# Patient Record
Sex: MALE | Race: WHITE | NOT HISPANIC OR LATINO
[De-identification: names, ages, dates, MRNs, and addresses within clinical notes are randomized per-mention and may not be internally consistent; named-entity substitution may affect disease eponyms.]

---
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
## 2021-04-30 ENCOUNTER — EMERGENCY (EMERGENCY)
Facility: HOSPITAL | Age: 21
LOS: 1 days | Discharge: HOME | End: 2021-04-30
Attending: EMERGENCY MEDICINE | Admitting: EMERGENCY MEDICINE
Payer: COMMERCIAL

## 2021-04-30 VITALS
RESPIRATION RATE: 18 BRPM | DIASTOLIC BLOOD PRESSURE: 99 MMHG | HEART RATE: 88 BPM | SYSTOLIC BLOOD PRESSURE: 156 MMHG | OXYGEN SATURATION: 100 % | TEMPERATURE: 98 F

## 2021-04-30 PROCEDURE — 99283 EMERGENCY DEPT VISIT LOW MDM: CPT

## 2021-04-30 PROCEDURE — 99053 MED SERV 10PM-8AM 24 HR FAC: CPT

## 2021-04-30 NOTE — ED ADULT TRIAGE NOTE - CHIEF COMPLAINT QUOTE
c/o swelling to foreskin. reports foreskin has been retracted for a few hours and has been difficult to pee. denies pain at present, only has pain with erections.

## 2021-05-01 VITALS
DIASTOLIC BLOOD PRESSURE: 78 MMHG | RESPIRATION RATE: 18 BRPM | OXYGEN SATURATION: 100 % | HEART RATE: 75 BPM | SYSTOLIC BLOOD PRESSURE: 129 MMHG

## 2021-05-01 DIAGNOSIS — N47.2 PARAPHIMOSIS: ICD-10-CM

## 2021-05-01 DIAGNOSIS — R33.9 RETENTION OF URINE, UNSPECIFIED: ICD-10-CM

## 2021-05-01 LAB
APPEARANCE UR: CLEAR — SIGNIFICANT CHANGE UP
BILIRUB UR-MCNC: NEGATIVE — SIGNIFICANT CHANGE UP
COLOR SPEC: SIGNIFICANT CHANGE UP
DIFF PNL FLD: NEGATIVE — SIGNIFICANT CHANGE UP
GLUCOSE UR QL: NEGATIVE — SIGNIFICANT CHANGE UP
KETONES UR-MCNC: NEGATIVE — SIGNIFICANT CHANGE UP
LEUKOCYTE ESTERASE UR-ACNC: NEGATIVE — SIGNIFICANT CHANGE UP
NITRITE UR-MCNC: NEGATIVE — SIGNIFICANT CHANGE UP
PH UR: 7 — SIGNIFICANT CHANGE UP (ref 5–8)
PROT UR-MCNC: NEGATIVE — SIGNIFICANT CHANGE UP
SP GR SPEC: 1.01 — SIGNIFICANT CHANGE UP (ref 1.01–1.02)
UROBILINOGEN FLD QL: SIGNIFICANT CHANGE UP

## 2021-05-01 RX ORDER — LIDOCAINE HCL 20 MG/ML
20 VIAL (ML) INJECTION ONCE
Refills: 0 | Status: COMPLETED | OUTPATIENT
Start: 2021-05-01 | End: 2021-05-01

## 2021-05-01 RX ADMIN — Medication 20 MILLILITER(S): at 02:09

## 2021-05-01 NOTE — CONSULT NOTE ADULT - PROBLEM SELECTOR RECOMMENDATION 2
-Patient to be discharged with esquivel catheter.  -Follow-up early next week for attempt at TOV.  -Patient to follow-up with the [facility], [phone]  -Discussed with Dr. Thomas

## 2021-05-01 NOTE — CONSULT NOTE ADULT - PROBLEM SELECTOR RECOMMENDATION 9
-Foreskin care education provided.  -Patient to not attempt to retract foreskin for the next week.  -Return to ED if paraphimosis recurs  -Follow-up at the [facility]

## 2021-05-01 NOTE — CONSULT NOTE ADULT - ASSESSMENT
21 year old male with a medical history of Depression s/p recent hospitalization, presenting to the ED with a paraphimosis and in urinary retention, s/p manual reduction of paraphimosis, and need of esquivel placement for retention.

## 2021-05-01 NOTE — ED PROVIDER NOTE - CLINICAL SUMMARY MEDICAL DECISION MAKING FREE TEXT BOX
O'Ervin DO PGY-1: pt p/w paraphimosis. Will reduce. Will do local block w/ 1% lido. TBDC O'Ervin DO PGY-1: pt p/w paraphimosis. Will reduce. Will do local block w/ 1% lido and monitor

## 2021-05-01 NOTE — ED PROVIDER NOTE - NSFOLLOWUPINSTRUCTIONS_ED_ALL_ED_FT
(1) Follow up with your primary care physician and urologist as discussed. In addition, we did not find evidence of a life threatening illness on your testing here today, but listed below are the specialists that will be necessary to see as an outpatient to continue the workup.  Please call the numbers listed below or [phone] to set up the necessary appointments.  (2) Immediately seek care at your nearest emergency room if your symptoms worsen, persist, or do not resolve   (3) Take Tylenol and/or Motrin as needed for pain per the dosing instructions on the bottle.     Acute Paraphimosis    WHAT YOU NEED TO KNOW:    Acute paraphimosis is abnormal tightness of the foreskin when it is pulled back. The foreskin is the skin that covers the head (glans) of the penis. Usually, the foreskin can be pulled back onto the penis and uncover the glans. Acute paraphimosis prevents your foreskin from being pulled back.     DISCHARGE INSTRUCTIONS:    Return to the emergency department if:   •You have sudden pain or swelling in your penis.  •You lose feeling in your penis.  •You have an open wound on your penis.    Contact your healthcare provider if:   •Your signs and symptoms return or worsen.  •You have pain during sexual activities.  •You have questions or concerns about your condition or care.    Self care:   •Do not have sex until your healthcare provider says it is okay. Do not have any sexual activity for 7 to 10 days, to allow the penis to heal. Sexual activity includes intercourse and masturbation. Ask when you can go back to your usual sexual activities.    •Keep your penis clean. Clean your penis every day by removing the smegma around your glans. Ask for more information about foreskin care.    •Gently move your foreskin back to the normal position. Every time your foreskin is pulled back, make sure it returns to its original position. The foreskin must always cover the glans. Do not force the foreskin back over the glans. Force can cause scars to form on the penis. [facility]  [address] [place] [address] [address], [place] [address]  [phone]    CALL AND MAKE APPOINTMENT AT THE ABOVE LOCATION FOR MONDAY OR TUESDAY.     (1) Follow up with your primary care physician and urologist as discussed. In addition, we did not find evidence of a life threatening illness on your testing here today, but listed below are the specialists that will be necessary to see as an outpatient to continue the workup.  Please call the numbers listed below or [phone] to set up the necessary appointments.  (2) Immediately seek care at your nearest emergency room if your symptoms worsen, persist, or do not resolve   (3) Take Tylenol and/or Motrin as needed for pain per the dosing instructions on the bottle.     Acute Paraphimosis    WHAT YOU NEED TO KNOW:    Acute paraphimosis is abnormal tightness of the foreskin when it is pulled back. The foreskin is the skin that covers the head (glans) of the penis. Usually, the foreskin can be pulled back onto the penis and uncover the glans. Acute paraphimosis prevents your foreskin from being pulled back.     DISCHARGE INSTRUCTIONS:    Return to the emergency department if:   •You have sudden pain or swelling in your penis.  •You lose feeling in your penis.  •You have an open wound on your penis.    Contact your healthcare provider if:   •Your signs and symptoms return or worsen.  •You have pain during sexual activities.  •You have questions or concerns about your condition or care.    Self care:   •Do not have sex until your healthcare provider says it is okay. Do not have any sexual activity for 7 to 10 days, to allow the penis to heal. Sexual activity includes intercourse and masturbation. Ask when you can go back to your usual sexual activities.    •Keep your penis clean. Clean your penis every day by removing the smegma around your glans. Ask for more information about foreskin care.    •Gently move your foreskin back to the normal position. Every time your foreskin is pulled back, make sure it returns to its original position. The foreskin must always cover the glans. Do not force the foreskin back over the glans. Force can cause scars to form on the penis.

## 2021-05-01 NOTE — ED PROVIDER NOTE - NSFOLLOWUPCLINICS_GEN_ALL_ED_FT
[facility]  Urology  [address], [address]rd & [address]th [place] [address]  Phone: [phone]  Fax:   Follow Up Time: 1-3 Days

## 2021-05-01 NOTE — ED PROVIDER NOTE - ATTENDING CONTRIBUTION TO CARE
HPI: 22 y/o uncircumsized M c/o a "retracted foreskin" that is not able to be reduced since 6-7pm. States he pulled it up to try to clean it then it would never go down. Pt is on trazodone but has not taken it in 3-4 days. Pt endorses pain on urination. Pt is not sexually active. In the ED pain is 3/10. no pain meds taken prior to arrival. denies any trauma except chronic masterbation. No penile discharge or bleeding.   EXAM: NAD, penis with paraphimosis, no discharge, no bleeding, no scrotal edema.   MDM: pt with uncircumsized penis that has paraphyimosis. Will reduce with dorsal nerve block and then manually reduce, if not able then may consult URO.

## 2021-05-01 NOTE — ED ADULT NURSE REASSESSMENT NOTE - NS ED NURSE REASSESS COMMENT FT1
Pt reporting lower abdominal pain, unable to urinate. MD US at bedside showing 700 cc urine in bladder. Pt placed in room 15, 14 fr esquivel cath placed using sterile technique. Pt tolerated well. Clear yellow urine draining to collection bag. PT immediately felt relief.
1100 cc urine into collection bag. Pt esquivel connected to leg bag. Teaching/ education done with patient.

## 2021-05-01 NOTE — ED PROVIDER NOTE - PHYSICAL EXAMINATION
CONSTITUTIONAL: Well-developed; well-nourished; in no acute distress.   SKIN: warm, dry  HEAD: Normocephalic; atraumatic.  EYES: no conjunctival injection.   ENT: No nasal discharge; airway clear.  NECK: Supple; non tender.  CARD: S1, S2 normal; no murmurs, gallops, or rubs. Regular rate and rhythm.   RESP: No wheezes, rales or rhonchi. Good air movement bilaterally.   ABD: soft ntnd, no guarding, no distention, no rigidity.   EXT: Ambulates independently.    : Performed w/ Dr. Benson. Paraphimosis.   NEURO: Alert, oriented, grossly unremarkable  PSYCH: Cooperative, appropriate.

## 2021-05-01 NOTE — ED ADULT NURSE NOTE - PAIN RATING/NUMBER SCALE (0-10): ACTIVITY
Will addend the note. Letter in epic that can be used also.   Thank you.  Dusty Otero, MPAS, PA-C    3

## 2021-05-01 NOTE — ED ADULT NURSE NOTE - OBJECTIVE STATEMENT
Pt is a 21 year old male reporting to the ED for "retracted foreskin". Pt reports this evening he was cleaning his foreskin and pulled skin back, pt reports foreskin did not go back after he retracted it.  Pt reports pain on urination. Pain at this time 3/10. Pt is AOX4. Pt denies chest pain or SOB. PT respirations even an unlabored. Pt appears to be comfortable, in NAD. Pt denies fever, chills, n/v/d. Pt denies abdominal pain, hematuria. awaiting further orders, will continue to monitor.

## 2021-05-01 NOTE — ED PROVIDER NOTE - PROGRESS NOTE DETAILS
Pt has successful paraphimosis reduction. Waiting to check it pt can urinate. If can, will dc to f/u with URO outpt. O'Ervin DO PGY-1: performed POCUS of bladder which showed roughly 720 ml. Will straight cath the patient. O'Ervin DO PGY-1: paged uro for consult O'Ervin DO PGY-1: will d/c pt w/ esquivel leg bag, uro want him to follow up in clinic early next week

## 2021-05-01 NOTE — ED PROVIDER NOTE - NS ED ROS FT
CONSTITUTIONAL - No fever, No diaphoresis, No weight change  EYES - No eye pain, No blurred vision  ENT - No change in hearing, No sore throat, No neck pain, No rhinorrhea, No ear pain  RESPIRATORY - No shortness of breath, No cough  CARDIAC -No chest pain, No palpitations  GI - No abdominal pain, No nausea, No vomiting, No diarrhea, No constipation  - pain on the tip of the penis.   MUSCULOSKELETAL - No joint pain, No swelling, No back pain  NEUROLOGIC - No numbness, No focal weakness, No headache, No dizziness

## 2021-05-01 NOTE — ED PROVIDER NOTE - PATIENT PORTAL LINK FT
You can access the FollowMyHealth Patient Portal offered by [facility] by registering at the following website: [email or web address]. By joining [employer]’s FollowMyHealth portal, you will also be able to view your health information using other applications (apps) compatible with our system.

## 2021-05-01 NOTE — ED PROCEDURE NOTE - ATTENDING CONTRIBUTION TO CARE
DR. LOPEZ, ATTENDING MD-  I was in the exam room and observed and supervised the resident when they were completing the key portions of this procedure.

## 2021-05-01 NOTE — ED PROCEDURE NOTE - CPROC ED POST PROC CARE GUIDE1
Verbal/written post procedure instructions were given to patient/caregiver./Instructed patient/caregiver to follow-up with primary care physician./Instructed patient/caregiver regarding signs and symptoms of infection.
Verbal/written post procedure instructions were given to patient/caregiver./Instructed patient/caregiver to follow-up with primary care physician./Instructed patient/caregiver regarding signs and symptoms of infection.

## 2021-05-01 NOTE — ED PROVIDER NOTE - OBJECTIVE STATEMENT
22 y/o M c/o a "retracted foreskin" since 6-7pm. States he pulled it up to try to clean it then it would never go down. Pt is on trazodone but has not taken it in 3-4 days. Pt endorses pain on urination. Pt is not sexually active. In the ED pain is 3/10.

## 2021-05-02 LAB
CULTURE RESULTS: NO GROWTH — SIGNIFICANT CHANGE UP
SPECIMEN SOURCE: SIGNIFICANT CHANGE UP

## 2021-05-03 ENCOUNTER — TRANSCRIPTION ENCOUNTER (OUTPATIENT)
Age: 21
End: 2021-05-03

## 2021-05-03 ENCOUNTER — APPOINTMENT (OUTPATIENT)
Dept: UROLOGY | Facility: CLINIC | Age: 21
End: 2021-05-03
Payer: COMMERCIAL

## 2021-05-03 VITALS
RESPIRATION RATE: 16 BRPM | HEIGHT: 67 IN | OXYGEN SATURATION: 97 % | TEMPERATURE: 98.1 F | DIASTOLIC BLOOD PRESSURE: 88 MMHG | HEART RATE: 110 BPM | BODY MASS INDEX: 28.25 KG/M2 | WEIGHT: 180 LBS | SYSTOLIC BLOOD PRESSURE: 145 MMHG

## 2021-05-03 DIAGNOSIS — Z78.9 OTHER SPECIFIED HEALTH STATUS: ICD-10-CM

## 2021-05-03 DIAGNOSIS — Z86.59 PERSONAL HISTORY OF OTHER MENTAL AND BEHAVIORAL DISORDERS: ICD-10-CM

## 2021-05-03 PROBLEM — Z00.00 ENCOUNTER FOR PREVENTIVE HEALTH EXAMINATION: Status: ACTIVE | Noted: 2021-05-03

## 2021-05-03 PROCEDURE — 99204 OFFICE O/P NEW MOD 45 MIN: CPT

## 2021-05-03 PROCEDURE — 99072 ADDL SUPL MATRL&STAF TM PHE: CPT

## 2021-05-03 RX ORDER — ESCITALOPRAM OXALATE 5 MG/1
TABLET, FILM COATED ORAL
Refills: 0 | Status: ACTIVE | COMMUNITY

## 2021-05-03 RX ORDER — TRIAMCINOLONE ACETONIDE 1 MG/G
0.1 CREAM TOPICAL TWICE DAILY
Qty: 1 | Refills: 0 | Status: ACTIVE | COMMUNITY
Start: 2021-05-03 | End: 1900-01-01

## 2021-05-03 RX ORDER — ARIPIPRAZOLE 2 MG/1
TABLET ORAL
Refills: 0 | Status: ACTIVE | COMMUNITY

## 2021-05-03 NOTE — PHYSICAL EXAM
[General Appearance - Well Developed] : well developed [General Appearance - Well Nourished] : well nourished [Normal Appearance] : normal appearance [Well Groomed] : well groomed [General Appearance - In No Acute Distress] : no acute distress [Edema] : no peripheral edema [Respiration, Rhythm And Depth] : normal respiratory rhythm and effort [Exaggerated Use Of Accessory Muscles For Inspiration] : no accessory muscle use [Abdomen Soft] : soft [Abdomen Tenderness] : non-tender [Costovertebral Angle Tenderness] : no ~M costovertebral angle tenderness [Urethral Meatus] : meatus normal [Penis Abnormality] : normal uncircumcised penis [Urinary Bladder Findings] : the bladder was normal on palpation [Scrotum] : the scrotum was normal [Testes Tenderness] : no tenderness of the testes [Testes Mass (___cm)] : there were no testicular masses [Normal Station and Gait] : the gait and station were normal for the patient's age [] : no rash [No Focal Deficits] : no focal deficits [Oriented To Time, Place, And Person] : oriented to person, place, and time [Affect] : the affect was normal [Mood] : the mood was normal [Not Anxious] : not anxious [FreeTextEntry1] : able to retract, no significant phimosis, however there is irritation/erythematous band noted of foreskin - likely corresponding to prior paraphimosis

## 2021-05-03 NOTE — ASSESSMENT
[FreeTextEntry1] : Patient is a 22 yo M who presents for urinary retention and paraphimosis.\par \par Longo removed today\par Passed TOV, PVR slightly elevated, however pt drank >1L in short duration\par For inflammed foreskin, topical steroid for 2 wks\par D/w pt follow up in 2 wks, will check PVR and foreskin\par F/u 2 wk\par \par I have spent 45 minutes of time on the encounter including reviewing prior records, discussing with pt the above condition and various treatment options, and documentation.

## 2021-05-03 NOTE — HISTORY OF PRESENT ILLNESS
[FreeTextEntry1] : Patient is a 22 yo M who presents for paraphimosis and urinary retention.\par He recently went to [facility] ER for paraphimosis.  He had been practicing retracting his foreskin while having an erection, as he has been discussing this with his friends that he should expose his head of penis.  He is not sexually active currently.\par After retracting he was unable to pull back over and developed paraphimosis.  He had some urge to void during paraphimosis, but unable to empty.  Ultimately in ER paraphimosis was reduced, however he had retention and esquivel was placed. \par Currently he has penile pain with catheter.  No fever/chills.\par Urine testing no infection from ER.\par \par He has depression/anxiety, recently started on lexapro.\par \par

## 2021-05-20 ENCOUNTER — APPOINTMENT (OUTPATIENT)
Dept: UROLOGY | Facility: CLINIC | Age: 21
End: 2021-05-20
Payer: COMMERCIAL

## 2021-05-20 VITALS
OXYGEN SATURATION: 97 % | TEMPERATURE: 97.5 F | SYSTOLIC BLOOD PRESSURE: 140 MMHG | RESPIRATION RATE: 16 BRPM | DIASTOLIC BLOOD PRESSURE: 88 MMHG | WEIGHT: 184 LBS | HEART RATE: 97 BPM

## 2021-05-20 DIAGNOSIS — N47.2 PARAPHIMOSIS: ICD-10-CM

## 2021-05-20 DIAGNOSIS — N47.8 OTHER DISORDERS OF PREPUCE: ICD-10-CM

## 2021-05-20 DIAGNOSIS — R33.8 OTHER RETENTION OF URINE: ICD-10-CM

## 2021-05-20 PROCEDURE — 99213 OFFICE O/P EST LOW 20 MIN: CPT

## 2021-05-20 PROCEDURE — 99072 ADDL SUPL MATRL&STAF TM PHE: CPT

## 2021-05-20 NOTE — ASSESSMENT
[FreeTextEntry1] : Patient is a 20 yo M who presents for urinary retention and paraphimosis.\par \par Voiding well\par PVR today 14cc\par Counseled on penile/foreskin hygiene\par D/w pt that he does not need to retract his foreskin during erections/sexual activity\par F/u PRN

## 2021-05-20 NOTE — PHYSICAL EXAM
[General Appearance - Well Developed] : well developed [General Appearance - Well Nourished] : well nourished [Normal Appearance] : normal appearance [Well Groomed] : well groomed [General Appearance - In No Acute Distress] : no acute distress [Urethral Meatus] : meatus normal [Penis Abnormality] : normal uncircumcised penis [Urinary Bladder Findings] : the bladder was normal on palpation [Scrotum] : the scrotum was normal [FreeTextEntry1] : easily retractable foreskin, no paraphimosis or phimosis.

## 2021-05-20 NOTE — HISTORY OF PRESENT ILLNESS
[FreeTextEntry1] : Patient is a 22 yo M who presents for f/u of paraphimosis and urinary retention.\par \par Here for f/u.  Voiding well.  Back at baseline, no voiding complaints.  Also has been able to retract his foreskin without difficulty, except for during erections hwere he will have a bit of pain.  He has been trying to retract during erection to expose his glans.\par \par PRIOR Hx:\par He recently went to [facility] ER for paraphimosis.  He had been practicing retracting his foreskin while having an erection, as he has been discussing this with his friends that he should expose his head of penis.  He is not sexually active currently.\par After retracting he was unable to pull back over and developed paraphimosis.  He had some urge to void during paraphimosis, but unable to empty.  Ultimately in ER paraphimosis was reduced, however he had retention and esquivel was placed. \par Currently he has penile pain with catheter.  No fever/chills.\par Urine testing no infection from ER.\par \par He has depression/anxiety, recently started on lexapro.\par \par

## 2021-06-02 ENCOUNTER — EMERGENCY (EMERGENCY)
Facility: HOSPITAL | Age: 21
LOS: 1 days | Discharge: HOME | End: 2021-06-02
Admitting: EMERGENCY MEDICINE
Payer: COMMERCIAL

## 2021-06-02 VITALS
SYSTOLIC BLOOD PRESSURE: 138 MMHG | TEMPERATURE: 98 F | DIASTOLIC BLOOD PRESSURE: 70 MMHG | OXYGEN SATURATION: 100 % | RESPIRATION RATE: 18 BRPM | HEART RATE: 93 BPM

## 2021-06-02 DIAGNOSIS — F43.20 ADJUSTMENT DISORDER, UNSPECIFIED: ICD-10-CM

## 2021-06-02 LAB
ALBUMIN SERPL ELPH-MCNC: 4.7 G/DL — SIGNIFICANT CHANGE UP (ref 3.3–5)
ALP SERPL-CCNC: 90 U/L — SIGNIFICANT CHANGE UP (ref 40–120)
ALT FLD-CCNC: 17 U/L — SIGNIFICANT CHANGE UP (ref 4–41)
AMPHET UR-MCNC: NEGATIVE — SIGNIFICANT CHANGE UP
ANION GAP SERPL CALC-SCNC: 12 MMOL/L — SIGNIFICANT CHANGE UP (ref 7–14)
APAP SERPL-MCNC: <15 UG/ML — SIGNIFICANT CHANGE UP (ref 15–25)
APPEARANCE UR: CLEAR — SIGNIFICANT CHANGE UP
AST SERPL-CCNC: 18 U/L — SIGNIFICANT CHANGE UP (ref 4–40)
BARBITURATES UR SCN-MCNC: NEGATIVE — SIGNIFICANT CHANGE UP
BASOPHILS # BLD AUTO: 0.06 K/UL — SIGNIFICANT CHANGE UP (ref 0–0.2)
BASOPHILS NFR BLD AUTO: 0.9 % — SIGNIFICANT CHANGE UP (ref 0–2)
BENZODIAZ UR-MCNC: NEGATIVE — SIGNIFICANT CHANGE UP
BILIRUB SERPL-MCNC: 0.3 MG/DL — SIGNIFICANT CHANGE UP (ref 0.2–1.2)
BILIRUB UR-MCNC: NEGATIVE — SIGNIFICANT CHANGE UP
BUN SERPL-MCNC: 14 MG/DL — SIGNIFICANT CHANGE UP (ref 7–23)
CALCIUM SERPL-MCNC: 9.5 MG/DL — SIGNIFICANT CHANGE UP (ref 8.4–10.5)
CHLORIDE SERPL-SCNC: 103 MMOL/L — SIGNIFICANT CHANGE UP (ref 98–107)
CO2 SERPL-SCNC: 24 MMOL/L — SIGNIFICANT CHANGE UP (ref 22–31)
COCAINE METAB.OTHER UR-MCNC: NEGATIVE — SIGNIFICANT CHANGE UP
COLOR SPEC: YELLOW — SIGNIFICANT CHANGE UP
COVID-19 SPIKE DOMAIN AB INTERP: POSITIVE
COVID-19 SPIKE DOMAIN ANTIBODY RESULT: 136 U/ML — HIGH
CREAT SERPL-MCNC: 0.75 MG/DL — SIGNIFICANT CHANGE UP (ref 0.5–1.3)
CREATININE URINE RESULT, DAU: 146 MG/DL — SIGNIFICANT CHANGE UP
DIFF PNL FLD: NEGATIVE — SIGNIFICANT CHANGE UP
EOSINOPHIL # BLD AUTO: 0.07 K/UL — SIGNIFICANT CHANGE UP (ref 0–0.5)
EOSINOPHIL NFR BLD AUTO: 1 % — SIGNIFICANT CHANGE UP (ref 0–6)
ETHANOL SERPL-MCNC: <10 MG/DL — SIGNIFICANT CHANGE UP
GLUCOSE SERPL-MCNC: 103 MG/DL — HIGH (ref 70–99)
GLUCOSE UR QL: NEGATIVE — SIGNIFICANT CHANGE UP
HCT VFR BLD CALC: 46.6 % — SIGNIFICANT CHANGE UP (ref 39–50)
HGB BLD-MCNC: 15.9 G/DL — SIGNIFICANT CHANGE UP (ref 13–17)
IANC: 5.16 K/UL — SIGNIFICANT CHANGE UP (ref 1.5–8.5)
IMM GRANULOCYTES NFR BLD AUTO: 0.3 % — SIGNIFICANT CHANGE UP (ref 0–1.5)
KETONES UR-MCNC: NEGATIVE — SIGNIFICANT CHANGE UP
LEUKOCYTE ESTERASE UR-ACNC: NEGATIVE — SIGNIFICANT CHANGE UP
LYMPHOCYTES # BLD AUTO: 1.42 K/UL — SIGNIFICANT CHANGE UP (ref 1–3.3)
LYMPHOCYTES # BLD AUTO: 20.2 % — SIGNIFICANT CHANGE UP (ref 13–44)
MCHC RBC-ENTMCNC: 30.8 PG — SIGNIFICANT CHANGE UP (ref 27–34)
MCHC RBC-ENTMCNC: 34.1 GM/DL — SIGNIFICANT CHANGE UP (ref 32–36)
MCV RBC AUTO: 90.3 FL — SIGNIFICANT CHANGE UP (ref 80–100)
METHADONE UR-MCNC: NEGATIVE — SIGNIFICANT CHANGE UP
MONOCYTES # BLD AUTO: 0.29 K/UL — SIGNIFICANT CHANGE UP (ref 0–0.9)
MONOCYTES NFR BLD AUTO: 4.1 % — SIGNIFICANT CHANGE UP (ref 2–14)
NEUTROPHILS # BLD AUTO: 5.16 K/UL — SIGNIFICANT CHANGE UP (ref 1.8–7.4)
NEUTROPHILS NFR BLD AUTO: 73.5 % — SIGNIFICANT CHANGE UP (ref 43–77)
NITRITE UR-MCNC: NEGATIVE — SIGNIFICANT CHANGE UP
NRBC # BLD: 0 /100 WBCS — SIGNIFICANT CHANGE UP
NRBC # FLD: 0 K/UL — SIGNIFICANT CHANGE UP
OPIATES UR-MCNC: NEGATIVE — SIGNIFICANT CHANGE UP
OXYCODONE UR-MCNC: NEGATIVE — SIGNIFICANT CHANGE UP
PCP SPEC-MCNC: SIGNIFICANT CHANGE UP
PCP UR-MCNC: NEGATIVE — SIGNIFICANT CHANGE UP
PH UR: 7 — SIGNIFICANT CHANGE UP (ref 5–8)
PLATELET # BLD AUTO: 360 K/UL — SIGNIFICANT CHANGE UP (ref 150–400)
POTASSIUM SERPL-MCNC: 3.8 MMOL/L — SIGNIFICANT CHANGE UP (ref 3.5–5.3)
POTASSIUM SERPL-SCNC: 3.8 MMOL/L — SIGNIFICANT CHANGE UP (ref 3.5–5.3)
PROT SERPL-MCNC: 8 G/DL — SIGNIFICANT CHANGE UP (ref 6–8.3)
PROT UR-MCNC: ABNORMAL
RBC # BLD: 5.16 M/UL — SIGNIFICANT CHANGE UP (ref 4.2–5.8)
RBC # FLD: 12 % — SIGNIFICANT CHANGE UP (ref 10.3–14.5)
SALICYLATES SERPL-MCNC: <5 MG/DL — LOW (ref 15–30)
SARS-COV-2 IGG+IGM SERPL QL IA: 136 U/ML — HIGH
SARS-COV-2 IGG+IGM SERPL QL IA: POSITIVE
SARS-COV-2 RNA SPEC QL NAA+PROBE: SIGNIFICANT CHANGE UP
SODIUM SERPL-SCNC: 139 MMOL/L — SIGNIFICANT CHANGE UP (ref 135–145)
SP GR SPEC: 1.02 — SIGNIFICANT CHANGE UP (ref 1.01–1.02)
THC UR QL: NEGATIVE — SIGNIFICANT CHANGE UP
TSH SERPL-MCNC: 1.47 UIU/ML — SIGNIFICANT CHANGE UP (ref 0.27–4.2)
UROBILINOGEN FLD QL: SIGNIFICANT CHANGE UP
WBC # BLD: 7.02 K/UL — SIGNIFICANT CHANGE UP (ref 3.8–10.5)
WBC # FLD AUTO: 7.02 K/UL — SIGNIFICANT CHANGE UP (ref 3.8–10.5)

## 2021-06-02 PROCEDURE — 90792 PSYCH DIAG EVAL W/MED SRVCS: CPT

## 2021-06-02 PROCEDURE — 99284 EMERGENCY DEPT VISIT MOD MDM: CPT

## 2021-06-02 NOTE — ED BEHAVIORAL HEALTH ASSESSMENT NOTE - DETAILS
call 911 or return to ED if symptoms worsen or if pt develops urge to harm self or others informed mother see HPI

## 2021-06-02 NOTE — ED PROVIDER NOTE - CLINICAL SUMMARY MEDICAL DECISION MAKING FREE TEXT BOX
This is a 21 yr old M, [facility] depression with c/o AH/HI. Pt arrived from home, lives with mother Ronda ( [phone]). Pt states he is suffering from chronic suicidal ideation for  a year. End of April he was taking edibles and wanted to kill himself and jump out of the window. He went to the hospital and was prescribed medications. Since then he did not see a psychiatrist only today had his first appointment via zoom with Dr Danielle Tamez. He states for the last 2 weeks he is hearing commanding voices and telling him to kill his mother. They do not have  good relationship for the last 4 years but slowly working things out.  About 1 week ago he picked up the knife to see how is it feel. He states his arms became numb and he did not act on trying to stab his mother.  He reports his mother did not know about it till today. 21-Oct-2018 23:22 This is a 21 yr old M, [facility] depression with c/o AH/HI. Pt arrived from home, lives with mother Ronda ( [phone]). Pt states he is suffering from chronic suicidal ideation for  a year. End of April he was taking edibles and wanted to kill himself and jump out of the window. He went to the hospital and was prescribed medications. Since then he did not see a psychiatrist only today had his first appointment via zoom with Dr Danielle Tamez. He states for the last 2 weeks he is hearing commanding voices and telling him to kill his mother. They do not have  good relationship for the last 4 years but slowly working things out.  About 1 week ago he picked up the knife to see how is it feel. He states his arms became numb and he did not act on trying to stab his mother.  He reports his mother did not know about it till today.  Labs- unremrkable  psych - out patient follow up

## 2021-06-02 NOTE — ED PROVIDER NOTE - OBJECTIVE STATEMENT
This is a 21 yr old M, [facility] depression with c/o AH/HI. Pt arrived from home, lives with mother Ronda ( [phone]). Pt states he is suffering from chronic suicidal ideation for  a year. End of April he was taking edibles and wanted to kill himself and jump out of the window. He went to the hospital and was prescribed medications. Since then he did not see a psychiatrist only today had his first appointment via zoom with Dr Danielle Tamez. He states for the last 2 weeks he is hearing commanding voices and telling him to kill his mother. They do not have  good relationship for the last 4 years but slowly working things out.  About 1 week ago he picked up the knife to see how is it feel. He states his arms became numb and he did not act on trying to stab his mother.  He reports his mother did not know about it till today.

## 2021-06-02 NOTE — ED PROVIDER NOTE - PATIENT PORTAL LINK FT
You can access the FollowMyHealth Patient Portal offered by [facility] by registering at the following website: [email or web address]. By joining [employer]’s FollowMyHealth portal, you will also be able to view your health information using other applications (apps) compatible with our system.

## 2021-06-02 NOTE — ED BEHAVIORAL HEALTH ASSESSMENT NOTE - REFERRAL / APPOINTMENT DETAILS
Per Danielle Tamez, pt being screened out of treatment with her; pt advised to follow-up at [facility]

## 2021-06-02 NOTE — ED ADULT TRIAGE NOTE - CHIEF COMPLAINT QUOTE
Pt with depression on multiple medications went to see his psychiatrist for SI and hearing voices stating " kill your mother " x 2 weeks.

## 2021-06-02 NOTE — ED BEHAVIORAL HEALTH ASSESSMENT NOTE - HPI (INCLUDE ILLNESS QUALITY, SEVERITY, DURATION, TIMING, CONTEXT, MODIFYING FACTORS, ASSOCIATED SIGNS AND SYMPTOMS)
Ronda: Hospitalized in [place] about 1 month ago, discharged early May 2021. Doing pretty well since discharge. Med compliant. More communicative with mother. Saw psychiatrist for first time today and said he has thoughts of killing himself and mother. No bizarre behavior, sleeping well. No SI/HI or physical aggression. Eating ok. Looking for a job. Helping out around the house. No substance use. Edibles in the past.   No acute safety concerns. 20 y/o male, student at [employer] (in [place], [place]), single, noncaregiver, lives with mother, self-reported history of depression, 1 past psych admission (at [facility] x 4 to 5 days end of April due to SI after ingesting edibles), discharged on Lexapro, Abilify, and Gabapentin, had 1st appointment today (6/2/21) with psych NP Danielle Tamez, no h/o self-injurious behavior or suicide attempts, no h/o violence or legal issues, no PMH, no h/o substance abuse, BIBEMS activated by Ms. Tamez for SI and HI.       Ronda: : Hospitalized in [place] about 1 month ago, discharged early May 2021. Doing pretty well since discharge. Med compliant. More communicative with mother. Saw psychiatrist for first time today and said he has thoughts of killing himself and mother. No bizarre behavior, sleeping well. No SI/HI or physical aggression. Eating ok. Looking for a job. Helping out around the house. No substance use. Edibles in the past.   No acute safety concerns.    EDILSON Tamez 20 y/o male, student at [employer] (in [place], [place]), single, noncaregiver, lives with mother, self-reported history of depression, 1 past psych admission (at [facility] x 4 to 5 days end of April due to SI after ingesting edibles), discharged on Lexapro, Abilify, and Gabapentin, had 1st appointment today (6/2/21) with psych NP Danielle Tamez, no h/o self-injurious behavior or suicide attempts, no h/o violence or legal issues, no PMH, no h/o substance abuse, BIBEMS activated by Ms. Tamez for SI and HI.     See  note for collateral from EDILSON Tamez.   Collateral from pt's mother, Ronda (): Pt was hospitalized in [place] about 1 month ago x several days, was discharged early May 2021. He has been doing pretty well since discharge. He is medication compliant. They have had some relationship issues, but their relationship has improved recently. He has been more communicative with her recently, and he is helping out around the house. He is also looking for a job. He has been sleeping and eating well, hasn't appeared depressed, manic, or psychotic. He hasn't verbalized SI/HI. He has not been threatening or physically aggressive. He saw EDILSON Tamez for first time today who informed pt's mother that she is referring pt to ED for evaluation for SI and homicidal thoughts toward mother. He used edibles in the past but no known substance use since discharge from [facility]. Mother denies acute safety concerns and feels comfortable with pt returning home at this time. 22 y/o male, student at [employer] (in [place], [place]), single, noncaregiver, lives with mother, self-reported history of depression, 1 past psych admission (at [facility] x 4 to 5 days end of April due to SI after ingesting edibles), discharged on Lexapro, Abilify, and Gabapentin, had 1st appointment today (6/2/21) with psych NP Danielle Tamez, no h/o self-injurious behavior or suicide attempts, no h/o violence or legal issues, no PMH, no h/o substance abuse, BIBEMS activated by Ms. Tamez for SI and HI.     See  note for collateral from EDILSON Tamez.   Collateral from pt's mother, Ronda (): Pt was hospitalized in [place] about 1 month ago x several days, was discharged early May 2021. He has been doing pretty well since discharge. He is medication compliant. They have had some relationship issues, but their relationship has improved recently. He has been more communicative with her recently, and he is helping out around the house. He is also looking for a job. He has been sleeping and eating well, hasn't appeared depressed, manic, or psychotic. He hasn't verbalized SI/HI. He has not been threatening or physically aggressive. He saw EDILSON Tamez for first time today who informed pt's mother that she is referring pt to ED for evaluation for SI and homicidal thoughts toward mother. He used edibles in the past but no known substance use since discharge from [facility]. Mother denies acute safety concerns and feels comfortable with pt returning home at this time.  On interview, pt is calm, cooperative, and in good behavioral control. 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 Pt admits to homicidal ideation toward his mother for the past 2 weeks. States these thoughts are "out of nowhere" and that his relationship with his mother is actually improving recently. He has had fleeting thoughts of stabbing his mother. About one week ago, he picked up a knife but it felt "weird" and he felt guilty so he put the knife down. He denies having any intent to harm or kill his mother or anyone else. States he loves his mother and would never harm her. He currently denies any homicidal or violent thoughts. Pt states he last experienced SI prior to hospitalization end of April 2021. Since then, he denies any suicidal ideation, intent, or plan. Pt denies persistently depressed mood or anhedonia. He reports good sleep and appetite. He denies manic symptoms. He denies paranoia, no delusional content elicited. He denies AH/VH. He reports medication compliance, denies using any substances since using edibles end of April 2021.

## 2021-06-02 NOTE — ED PROVIDER NOTE - NSFOLLOWUPCLINICS_GEN_ALL_ED_FT
[facility]  Behavioral Health  [address] [place] [address]  Phone: [phone]  Fax:

## 2021-06-02 NOTE — ED ADULT NURSE NOTE - CAS DISCH TRANSFER METHOD
pt alert and oriented x4 but insisting "someone emptied his urinal but neither the RN or PCA emptied anything other than the 100cc
Private car

## 2021-06-02 NOTE — ED ADULT NURSE REASSESSMENT NOTE - NS ED NURSE REASSESS COMMENT FT1
Pt cleared for discharge and provided with discharge instructions. Pt maintained behavioral control  and did not require any medication.

## 2021-06-02 NOTE — ED BEHAVIORAL HEALTH ASSESSMENT NOTE - SUMMARY
22 y/o male, student at [employer] (in [place], [place]), single, noncaregiver, lives with mother, self-reported history of depression, 1 past psych admission (at [facility] x 4 to 5 days end of April due to SI after ingesting edibles), discharged on Lexapro, Abilify, and Gabapentin, had 1st appointment today (6/2/21) with psych NP Danielle Tamez, no h/o self-injurious behavior or suicide attempts, no h/o violence or legal issues, no PMH, no h/o substance abuse, BIBEMS activated by Ms. Tamez for SI and HI.   Patient reports homicidal ideation toward his mother for the past 2 weeks with no clear triggers. Pt has had fleeting thoughts of harming her with a knife, but he adamantly denies any homicidal or violent intent. States relationship with his mother has recently improved, and he doesn't know why he has been having these thoughts. He denies current homicidal or violent ideation, intent, or plan. States last time he experienced SI was prior to psych admission end of April 2021. Pt adamantly denies any SI since discharge from hospital in May 2021. He denies suicidal ideation, intent, or plan. He identifies reasons for living and is future-oriented. He engages in safety planning. He denies paranoia, no delusional content elicited. He denies AH/VH. He denies manic symptoms, denies persistently depressed mood or anhedonia. He does not appear psychotic, manic, or acutely depressed. Pt's mother denies acute safety concerns (for herself/patient). Pt does not present an acute danger to self or others and does not meet criteria for involuntary psychiatric admission at this time. Pt declines voluntary psychiatric admission at this time.

## 2021-06-02 NOTE — ED ADULT NURSE NOTE - OBJECTIVE STATEMENT
pt bib ems called by his psychiatrist for verbalizing SI and AH. pt calm on arrival. Denies VH,HI,etoh, drug use.  states voices are telling deisi to kill his mom. Pt SI plan is to jump out of a window

## 2021-06-02 NOTE — ED BEHAVIORAL HEALTH ASSESSMENT NOTE - SAFETY PLAN ADDT'L DETAILS
Safety plan discussed with.../Education provided regarding environmental safety / lethal means restriction/Provision of National Suicide Prevention Lifeline 4-188-075-GBHB (8152)

## 2021-06-02 NOTE — ED BEHAVIORAL HEALTH NOTE - BEHAVIORAL HEALTH NOTE
Danielle Tamez NP  - [facility] - Private Practice (Telepsych) -     First time meeting him for an intake s/p inpatient discharge.   He reported that he was in the hospital for a week after taking Cannabis Then having a panic attack, no delusions. Went to a hospital in [place] because that's where his school is.   Admitted on May 6 - for 7 days.   Discharged on Lexapro 10mg, gabapentin 100mg po tid, and Abilify 5mg. Admits adherence with medications but did not take Lexapro for a couple of days in May  Intake today after a discharge.   He reported being very anxious, displayed a lot of anger towards his mom, for the past 2 weeks he's been having homicidal thoughts towards his mom. She Inquired about his access to a weapon and he said he would stab his mom with a knife. He said he didn't want to do it, but has been thinking about it.  Passive suicidal thoughts - would use a knife to slit his wrists but has not engaged in any self injury.   NP More concerned about homicidal thoughts.   Denied any substance use  Feels that he needs a higher level of care.   NP will work with other clinicians to provide follow up care.  Will call back in 10 mins with f/u appt. Danielle Tamez NP  - [facility] - Private Practice (Telepsych) -     First time meeting him for an intake s/p inpatient discharge.   He reported that he was in the hospital for a week after taking Cannabis Then having a panic attack, no delusions. Went to a hospital in [place] because that's where his school is.   Admitted on May 6 - for 7 days.   Discharged on Lexapro 10mg, gabapentin 100mg po tid, and Abilify 5mg. Admits adherence with medications but did not take Lexapro for a couple of days in May  Intake today after a discharge.   He reported being very anxious, displayed a lot of anger towards his mom, for the past 2 weeks he's been having homicidal thoughts towards his mom. She Inquired about his access to a weapon and he said he would stab his mom with a knife. He said he didn't want to do it, but has been thinking about it.  Passive suicidal thoughts - would use a knife to slit his wrists but has not engaged in any self injury.   NP More concerned about homicidal thoughts.   Denied any substance use  Feels that he needs a higher level of care.   NP will work with other clinicians to provide follow up care.  Will call back in 10 mins with f/u appt.    Patient screened out of clinic. Will need a new referral.

## 2021-06-02 NOTE — ED BEHAVIORAL HEALTH ASSESSMENT NOTE - DESCRIPTION
none see HPI calm, cooperative, in good behavioral control throughout ED course  Vital Signs Last 24 Hrs  T(C): 36.8 (02 Jun 2021 12:33), Max: 36.8 (02 Jun 2021 12:33)  T(F): 98.2 (02 Jun 2021 12:33), Max: 98.2 (02 Jun 2021 12:33)  HR: 93 (02 Jun 2021 12:33) (93 - 93)  BP: 138/70 (02 Jun 2021 12:33) (138/70 - 138/70)  BP(mean): --  RR: 18 (02 Jun 2021 12:33) (18 - 18)  SpO2: 100% (02 Jun 2021 12:33) (100% - 100%)

## 2021-06-02 NOTE — ED BEHAVIORAL HEALTH ASSESSMENT NOTE - RISK ASSESSMENT
Low Acute Suicide Risk Risk factors include history of recent discharge from a psychiatric facility, h/o depression, homicidal thoughts.  Protective factors include no suicide attempts, no violence history, medication compliance, no access to guns, no global insomnia, no substance abuse, supportive family, willingness to seek help, no suicidal ideation, no homicidal or violent intent, identifies reasons for living, future-oriented.   Pt is not at acutely elevated risk of harm to self or others.

## 2021-06-04 PROBLEM — F32.9 MAJOR DEPRESSIVE DISORDER, SINGLE EPISODE, UNSPECIFIED: Chronic | Status: ACTIVE | Noted: 2021-06-02

## 2021-06-07 ENCOUNTER — OUTPATIENT (OUTPATIENT)
Dept: OUTPATIENT SERVICES | Facility: HOSPITAL | Age: 21
LOS: 1 days | Discharge: STILL ADMITTED OR ADMINISTRATIVE | End: 2021-06-07
Payer: COMMERCIAL

## 2021-06-08 DIAGNOSIS — F42.9 OBSESSIVE-COMPULSIVE DISORDER, UNSPECIFIED: ICD-10-CM

## 2021-06-08 DIAGNOSIS — F32.9 MAJOR DEPRESSIVE DISORDER, SINGLE EPISODE, UNSPECIFIED: ICD-10-CM

## 2021-06-28 PROCEDURE — 99214 OFFICE O/P EST MOD 30 MIN: CPT

## 2021-07-14 ENCOUNTER — OUTPATIENT (OUTPATIENT)
Dept: OUTPATIENT SERVICES | Facility: HOSPITAL | Age: 21
LOS: 1 days | Discharge: HOME | End: 2021-07-14
Payer: COMMERCIAL

## 2021-07-16 PROCEDURE — 90792 PSYCH DIAG EVAL W/MED SRVCS: CPT | Mod: 95

## 2021-08-12 DIAGNOSIS — F42.9 OBSESSIVE-COMPULSIVE DISORDER, UNSPECIFIED: ICD-10-CM

## 2021-08-24 PROCEDURE — 90834 PSYTX W PT 45 MINUTES: CPT | Mod: 95

## 2021-08-24 PROCEDURE — 99214 OFFICE O/P EST MOD 30 MIN: CPT | Mod: 95

## 2021-08-31 PROCEDURE — 90853 GROUP PSYCHOTHERAPY: CPT | Mod: 95

## 2021-08-31 PROCEDURE — 90834 PSYTX W PT 45 MINUTES: CPT | Mod: 59,95

## 2021-09-14 PROCEDURE — 90834 PSYTX W PT 45 MINUTES: CPT | Mod: 95

## 2021-09-17 PROCEDURE — 99214 OFFICE O/P EST MOD 30 MIN: CPT | Mod: 95

## 2021-09-22 NOTE — ED PROCEDURE NOTE - CPROC ED INFORMED CONSENT1
Benefits, risks, and possible complications of procedure explained to patient/caregiver who verbalized understanding and gave verbal consent.
Benefits, risks, and possible complications of procedure explained to patient/caregiver who verbalized understanding and gave verbal consent.
No

## 2022-02-01 ENCOUNTER — EMERGENCY (EMERGENCY)
Facility: HOSPITAL | Age: 22
LOS: 1 days | Discharge: HOME | End: 2022-02-01
Attending: EMERGENCY MEDICINE
Payer: COMMERCIAL

## 2022-02-01 VITALS
HEART RATE: 134 BPM | DIASTOLIC BLOOD PRESSURE: 73 MMHG | SYSTOLIC BLOOD PRESSURE: 136 MMHG | OXYGEN SATURATION: 97 % | WEIGHT: 205.03 LBS | TEMPERATURE: 100 F | HEIGHT: 67 IN | RESPIRATION RATE: 18 BRPM

## 2022-02-01 PROCEDURE — 99285 EMERGENCY DEPT VISIT HI MDM: CPT

## 2022-02-01 PROCEDURE — 99053 MED SERV 10PM-8AM 24 HR FAC: CPT

## 2022-02-02 VITALS
HEART RATE: 87 BPM | SYSTOLIC BLOOD PRESSURE: 132 MMHG | TEMPERATURE: 99 F | OXYGEN SATURATION: 97 % | DIASTOLIC BLOOD PRESSURE: 77 MMHG | RESPIRATION RATE: 18 BRPM

## 2022-02-02 LAB
ALBUMIN SERPL ELPH-MCNC: 3.5 G/DL — SIGNIFICANT CHANGE UP (ref 3.5–5)
ALP SERPL-CCNC: 97 U/L — SIGNIFICANT CHANGE UP (ref 40–120)
ALT FLD-CCNC: 32 U/L DA — SIGNIFICANT CHANGE UP (ref 10–60)
ANION GAP SERPL CALC-SCNC: 7 MMOL/L — SIGNIFICANT CHANGE UP (ref 5–17)
AST SERPL-CCNC: 17 U/L — SIGNIFICANT CHANGE UP (ref 10–40)
BASOPHILS # BLD AUTO: 0.04 K/UL — SIGNIFICANT CHANGE UP (ref 0–0.2)
BASOPHILS NFR BLD AUTO: 0.3 % — SIGNIFICANT CHANGE UP (ref 0–2)
BILIRUB SERPL-MCNC: 0.2 MG/DL — SIGNIFICANT CHANGE UP (ref 0.2–1.2)
BUN SERPL-MCNC: 11 MG/DL — SIGNIFICANT CHANGE UP (ref 7–18)
CALCIUM SERPL-MCNC: 8.7 MG/DL — SIGNIFICANT CHANGE UP (ref 8.4–10.5)
CHLORIDE SERPL-SCNC: 103 MMOL/L — SIGNIFICANT CHANGE UP (ref 96–108)
CO2 SERPL-SCNC: 27 MMOL/L — SIGNIFICANT CHANGE UP (ref 22–31)
CREAT SERPL-MCNC: 1.02 MG/DL — SIGNIFICANT CHANGE UP (ref 0.5–1.3)
EOSINOPHIL # BLD AUTO: 0.1 K/UL — SIGNIFICANT CHANGE UP (ref 0–0.5)
EOSINOPHIL NFR BLD AUTO: 0.8 % — SIGNIFICANT CHANGE UP (ref 0–6)
GLUCOSE SERPL-MCNC: 113 MG/DL — HIGH (ref 70–99)
HCT VFR BLD CALC: 42 % — SIGNIFICANT CHANGE UP (ref 39–50)
HGB BLD-MCNC: 14.1 G/DL — SIGNIFICANT CHANGE UP (ref 13–17)
IMM GRANULOCYTES NFR BLD AUTO: 0.5 % — SIGNIFICANT CHANGE UP (ref 0–1.5)
LYMPHOCYTES # BLD AUTO: 1.78 K/UL — SIGNIFICANT CHANGE UP (ref 1–3.3)
LYMPHOCYTES # BLD AUTO: 14.8 % — SIGNIFICANT CHANGE UP (ref 13–44)
MAGNESIUM SERPL-MCNC: 2 MG/DL — SIGNIFICANT CHANGE UP (ref 1.6–2.6)
MCHC RBC-ENTMCNC: 29.8 PG — SIGNIFICANT CHANGE UP (ref 27–34)
MCHC RBC-ENTMCNC: 33.6 GM/DL — SIGNIFICANT CHANGE UP (ref 32–36)
MCV RBC AUTO: 88.8 FL — SIGNIFICANT CHANGE UP (ref 80–100)
MONOCYTES # BLD AUTO: 0.56 K/UL — SIGNIFICANT CHANGE UP (ref 0–0.9)
MONOCYTES NFR BLD AUTO: 4.7 % — SIGNIFICANT CHANGE UP (ref 2–14)
NEUTROPHILS # BLD AUTO: 9.47 K/UL — HIGH (ref 1.8–7.4)
NEUTROPHILS NFR BLD AUTO: 78.9 % — HIGH (ref 43–77)
NRBC # BLD: 0 /100 WBCS — SIGNIFICANT CHANGE UP (ref 0–0)
PLATELET # BLD AUTO: 334 K/UL — SIGNIFICANT CHANGE UP (ref 150–400)
POTASSIUM SERPL-MCNC: 3.9 MMOL/L — SIGNIFICANT CHANGE UP (ref 3.5–5.3)
POTASSIUM SERPL-SCNC: 3.9 MMOL/L — SIGNIFICANT CHANGE UP (ref 3.5–5.3)
PROT SERPL-MCNC: 7.6 G/DL — SIGNIFICANT CHANGE UP (ref 6–8.3)
RBC # BLD: 4.73 M/UL — SIGNIFICANT CHANGE UP (ref 4.2–5.8)
RBC # FLD: 12.1 % — SIGNIFICANT CHANGE UP (ref 10.3–14.5)
SODIUM SERPL-SCNC: 137 MMOL/L — SIGNIFICANT CHANGE UP (ref 135–145)
TROPONIN I, HIGH SENSITIVITY RESULT: 7.7 NG/L — SIGNIFICANT CHANGE UP
WBC # BLD: 12.01 K/UL — HIGH (ref 3.8–10.5)
WBC # FLD AUTO: 12.01 K/UL — HIGH (ref 3.8–10.5)

## 2022-02-02 PROCEDURE — 93005 ELECTROCARDIOGRAM TRACING: CPT

## 2022-02-02 PROCEDURE — 36415 COLL VENOUS BLD VENIPUNCTURE: CPT

## 2022-02-02 PROCEDURE — 84484 ASSAY OF TROPONIN QUANT: CPT

## 2022-02-02 PROCEDURE — 99284 EMERGENCY DEPT VISIT MOD MDM: CPT

## 2022-02-02 PROCEDURE — 83735 ASSAY OF MAGNESIUM: CPT

## 2022-02-02 PROCEDURE — 80053 COMPREHEN METABOLIC PANEL: CPT

## 2022-02-02 PROCEDURE — 85025 COMPLETE CBC W/AUTO DIFF WBC: CPT

## 2022-02-02 RX ORDER — SODIUM CHLORIDE 9 MG/ML
1000 INJECTION INTRAMUSCULAR; INTRAVENOUS; SUBCUTANEOUS ONCE
Refills: 0 | Status: COMPLETED | OUTPATIENT
Start: 2022-02-02 | End: 2022-02-02

## 2022-02-02 RX ADMIN — SODIUM CHLORIDE 1000 MILLILITER(S): 9 INJECTION INTRAMUSCULAR; INTRAVENOUS; SUBCUTANEOUS at 00:20

## 2022-02-02 RX ADMIN — SODIUM CHLORIDE 1000 MILLILITER(S): 9 INJECTION INTRAMUSCULAR; INTRAVENOUS; SUBCUTANEOUS at 01:20

## 2022-02-02 NOTE — ED PROVIDER NOTE - NSFOLLOWUPINSTRUCTIONS_ED_ALL_ED_FT
Avoid taking nonprescribed medications.  Followup with PMD for reevaluation.      Heart Palpitations    WHAT YOU NEED TO KNOW:    Heart palpitations are feelings that your heart races, jumps, throbs, or flutters. You may feel extra beats, no beats for a short time, or skipped beats. You may have these feelings in your chest, throat, or neck. They may happen when you are sitting, standing, or lying. Heart palpitations may be frightening, but are usually not caused by a serious problem.     DISCHARGE INSTRUCTIONS:    Call 911 or have someone else call for any of the following:   •You have any of the following signs of a heart attack: ?Squeezing, pressure, or pain in your chest      ?You may also have any of the following: ?Discomfort or pain in your back, neck, jaw, stomach, or arm      ?Shortness of breath      ?Nausea or vomiting      ?Lightheadedness or a sudden cold sweat        •You have any of the following signs of a stroke: ?Numbness or drooping on one side of your face       ?Weakness in an arm or leg      ?Confusion or difficulty speaking      ?Dizziness, a severe headache, or vision loss      •You faint or lose consciousness.       Return to the emergency department if:   •Your palpitations happen more often or get more intense.           Contact your healthcare provider if:   •You have new or worsening swelling in your feet or ankles.      •You have questions or concerns about your condition or care.      Follow up with your healthcare provider as directed: You may need to follow up with a cardiologist. You may need tests to check for heart problems that cause palpitations. Write down your questions so you remember to ask them during your visits.     Keep a record: Write down when your palpitations start and stop, what you were doing when they started, and your symptoms. Keep track of what you ate or drank within a few hours of your palpitations. Include anything that seemed to help your symptoms, such as lying down or holding your breath. This record will help you and your healthcare provider learn what triggers your palpitations. Bring this record with you to your follow up visits.    Help prevent heart palpitations:   •Manage stress and anxiety. Find ways to relax such as listening to music, meditating, or doing yoga. Exercise can also help decrease stress and anxiety. Talk to someone you trust about your stress or anxiety. You can also talk to a therapist.       •Get plenty of sleep every night. Ask your healthcare provider how much sleep you need each night.       •Do not drink caffeine or alcohol. Caffeine and alcohol can make your palpitations worse. Caffeine is found in soda, coffee, tea, chocolate, and drinks that increase your energy.       •Do not smoke. Nicotine and other chemicals in cigarettes and cigars may damage your heart and blood vessels. Ask your healthcare provider for information if you currently smoke and need help to quit. E-cigarettes or smokeless tobacco still contain nicotine. Talk to your healthcare provider before you use these products.       •Do not use illegal drugs. Talk to your healthcare provider if you use illegal drugs and want help to quit.

## 2022-02-02 NOTE — ED PROVIDER NOTE - CLINICAL SUMMARY MEDICAL DECISION MAKING FREE TEXT BOX
23yo M with hx depression on citalopram presents with dizziness and palpitations after eating edible THC. ekg shows sinus tachycardia with . Will obtain labs, given IVF. Will reassess. 23yo M with hx depression on citalopram presents with dizziness and palpitations after eating edible THC. ekg shows sinus tachycardia with . Will obtain labs, given IVF. Will reassess.  labs unremarkable  On reeval patient reports significant improvement of symptoms. patient stable for discharge.

## 2022-02-02 NOTE — ED PROVIDER NOTE - PATIENT PORTAL LINK FT
You can access the FollowMyHealth Patient Portal offered by [facility] by registering at the following website: [email or web address]. By joining [employer]’s FollowMyHealth portal, you will also be able to view your health information using other applications (apps) compatible with our system.

## 2022-02-02 NOTE — ED PROVIDER NOTE - NORMAL, MLM
76 year-old, history of Anxiety, Depression, no in-patient hospitalization, no suicidal ideation or suicide attempt, admitted for further medical workup after being found wandering.     Patient is AAOx3 today, improved from previous evaluation. Labwork is relatively WNL with elevated B12 and Low TSH. Patient is set to be discharged to rehab on Monday. She is more oriented today, with logical thought process and ability to have a conversation. Denies suicidal or homicidal ideations, intent or plan. Denies any psychotic symptoms including A/V/T hallucinations. Denies manic symptoms. Reports sleeping well and denies any acute distress at this time. She says that she lives alone.    Writer spoke to nephew Toi Sinclair ([phone]): States that he lives in [place] but is the primary collateral for patient. He says that in the past she has experienced AMS 2/2 UTI. She has been on current regimen of medications for "years" but is unsure if dosage of Remeron has always been 30mg. Toi is unsure of prescriber for psychiatric meds but reports that patient is "independent" and was just driving 2 weeks ago. She takes herself to her appointments with her doctor. She has never displayed any risky or aggressive behaviors and has never been suicidal. She is experiencing progressive dementia since 2019 but at baseline is linear, organized, coherent, and logical. She takes medications for anxiety and depression. elsa all pertinent systems normal

## 2022-02-02 NOTE — ED PROVIDER NOTE - OBJECTIVE STATEMENT
21yo M with hx depression on citalopram presents with dizziness and palpitations after eating edible THC. Reports he ingested 50mg of edible THC, new brand for him and 1.5hr later he started to feel lightheaded and palpitations, patient reports fear of dying thus came to ED for evaluation. Denies chest pain, shortness of breath, vomiting, abd pain, headache, leg swelling/recent travel. reports he has taken similar doses of THC but different brand without complications.

## 2022-02-02 NOTE — ED ADULT NURSE NOTE - OBJECTIVE STATEMENT
23 yo male lying on bed c/o rapid heart beat that after eating gummy bears that had THC at around 2130 tonight.

## 2023-03-27 NOTE — ED PROVIDER NOTE - IV ALTEPLASE EXCL ABS HIDDEN
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 This can be caused by different things. The most common is a diabetic 6th or small vessel damage to the 6th nerve related to diabetes/hypertension/cholesterol. It is not common to have this happen to multiple nerves as you have.     Other possibility would be inflammation of the nerve due to an autoimmune condition like sarcoidosis.     We don't want to miss this because it could potentially be treatable.     I recommend you meet with primary care to discuss blood pressure and diabetes control. You can also ask about the allergies and sinus problems.     -PET scan to look for sarcoidosis   -blood work today   -follow up with Dr. Fonseca as previously planned   
show